# Patient Record
Sex: FEMALE | Race: BLACK OR AFRICAN AMERICAN
[De-identification: names, ages, dates, MRNs, and addresses within clinical notes are randomized per-mention and may not be internally consistent; named-entity substitution may affect disease eponyms.]

---

## 2017-06-02 ENCOUNTER — HOSPITAL ENCOUNTER (EMERGENCY)
Dept: HOSPITAL 74 - JER | Age: 42
Discharge: HOME | End: 2017-06-02
Payer: COMMERCIAL

## 2017-06-02 VITALS — TEMPERATURE: 98 F | HEART RATE: 64 BPM | SYSTOLIC BLOOD PRESSURE: 109 MMHG | DIASTOLIC BLOOD PRESSURE: 71 MMHG

## 2017-06-02 VITALS — BODY MASS INDEX: 20.7 KG/M2

## 2017-06-02 DIAGNOSIS — N93.9: Primary | ICD-10-CM

## 2017-06-02 LAB
ALBUMIN SERPL-MCNC: 3.7 G/DL (ref 3.4–5)
ALP SERPL-CCNC: 50 U/L (ref 45–117)
ALT SERPL-CCNC: 24 U/L (ref 12–78)
ANION GAP SERPL CALC-SCNC: 9 MMOL/L (ref 8–16)
APPEARANCE UR: CLEAR
AST SERPL-CCNC: 18 U/L (ref 15–37)
BACTERIA #/AREA URNS HPF: (no result) /HPF
BASOPHILS # BLD: 1.3 % (ref 0–2)
BILIRUB SERPL-MCNC: 1.1 MG/DL (ref 0.2–1)
BILIRUB UR STRIP.AUTO-MCNC: NEGATIVE MG/DL
CALCIUM SERPL-MCNC: 8.4 MG/DL (ref 8.5–10.1)
CO2 SERPL-SCNC: 23 MMOL/L (ref 21–32)
COLOR UR: (no result)
CREAT SERPL-MCNC: 0.6 MG/DL (ref 0.55–1.02)
DEPRECATED RDW RBC AUTO: 13.5 % (ref 11.6–15.6)
EOSINOPHIL # BLD: 4.9 % (ref 0–4.5)
GLUCOSE SERPL-MCNC: 83 MG/DL (ref 74–106)
KETONES UR QL STRIP: NEGATIVE
LEUKOCYTE ESTERASE UR QL STRIP.AUTO: NEGATIVE
MCH RBC QN AUTO: 30.6 PG (ref 25.7–33.7)
MCHC RBC AUTO-ENTMCNC: 33.9 G/DL (ref 32–36)
MCV RBC: 90.2 FL (ref 80–96)
MUCOUS THREADS URNS QL MICRO: (no result)
NEUTROPHILS # BLD: 47.7 % (ref 42.8–82.8)
NITRITE UR QL STRIP: NEGATIVE
PH UR: 6 [PH] (ref 5–8)
PLATELET # BLD AUTO: 160 K/MM3 (ref 134–434)
PMV BLD: 8.4 FL (ref 7.5–11.1)
PROT SERPL-MCNC: 6.7 G/DL (ref 6.4–8.2)
PROT UR QL STRIP: NEGATIVE
PROT UR QL STRIP: NEGATIVE
RBC # BLD AUTO: 2 /HPF (ref 0–3)
RBC # UR STRIP: (no result) /UL
SP GR UR: 1.02 (ref 1–1.02)
UROBILINOGEN UR STRIP-MCNC: NEGATIVE E.U./DL (ref 0.2–1)
WBC # BLD AUTO: 4.3 K/MM3 (ref 4–10)
WBC # UR AUTO: 2 /HPF (ref 3–5)

## 2017-08-20 ENCOUNTER — HOSPITAL ENCOUNTER (EMERGENCY)
Dept: HOSPITAL 74 - JER | Age: 42
Discharge: HOME | End: 2017-08-20
Payer: COMMERCIAL

## 2017-08-20 VITALS — HEART RATE: 68 BPM | SYSTOLIC BLOOD PRESSURE: 122 MMHG | DIASTOLIC BLOOD PRESSURE: 65 MMHG

## 2017-08-20 VITALS — TEMPERATURE: 98 F

## 2017-08-20 VITALS — BODY MASS INDEX: 20.7 KG/M2

## 2017-08-20 DIAGNOSIS — Z87.09: ICD-10-CM

## 2017-08-20 DIAGNOSIS — R00.2: Primary | ICD-10-CM

## 2017-08-20 LAB
ALBUMIN SERPL-MCNC: 4.3 G/DL (ref 3.4–5)
ALP SERPL-CCNC: 51 U/L (ref 45–117)
ALT SERPL-CCNC: 22 U/L (ref 12–78)
ANION GAP SERPL CALC-SCNC: 7 MMOL/L (ref 8–16)
APPEARANCE UR: (no result)
AST SERPL-CCNC: 17 U/L (ref 15–37)
BASOPHILS # BLD: 0.9 % (ref 0–2)
BILIRUB SERPL-MCNC: 1.3 MG/DL (ref 0.2–1)
BILIRUB UR STRIP.AUTO-MCNC: NEGATIVE MG/DL
CALCIUM SERPL-MCNC: 9.1 MG/DL (ref 8.5–10.1)
CK SERPL-CCNC: 517 IU/L (ref 26–192)
CO2 SERPL-SCNC: 27 MMOL/L (ref 21–32)
COLOR UR: YELLOW
CREAT SERPL-MCNC: 0.8 MG/DL (ref 0.55–1.02)
DEPRECATED RDW RBC AUTO: 13.7 % (ref 11.6–15.6)
EOSINOPHIL # BLD: 5.8 % (ref 0–4.5)
GLUCOSE SERPL-MCNC: 84 MG/DL (ref 74–106)
KETONES UR QL STRIP: NEGATIVE
LEUKOCYTE ESTERASE UR QL STRIP.AUTO: NEGATIVE
MCH RBC QN AUTO: 30.8 PG (ref 25.7–33.7)
MCHC RBC AUTO-ENTMCNC: 34.1 G/DL (ref 32–36)
MCV RBC: 90.3 FL (ref 80–96)
MUCOUS THREADS URNS QL MICRO: (no result)
NEUTROPHILS # BLD: 45.4 % (ref 42.8–82.8)
NITRITE UR QL STRIP: NEGATIVE
PH UR: 5 [PH] (ref 5–8)
PLATELET # BLD AUTO: 201 K/MM3 (ref 134–434)
PMV BLD: 9.1 FL (ref 7.5–11.1)
PROT SERPL-MCNC: 7.7 G/DL (ref 6.4–8.2)
PROT UR QL STRIP: NEGATIVE
PROT UR QL STRIP: NEGATIVE
RBC # BLD AUTO: 202 /HPF (ref 0–3)
RBC # UR STRIP: (no result) /UL
SP GR UR: 1.02 (ref 1–1.02)
TROPONIN I SERPL-MCNC: < 0.02 NG/ML (ref 0–0.05)
TSH SERPL-ACNC: 1.03 UIU/ML (ref 0.36–3.74)
UROBILINOGEN UR STRIP-MCNC: NEGATIVE MG/DL (ref 0.2–1)
WBC # BLD AUTO: 4.8 K/MM3 (ref 4–10)
WBC # UR AUTO: 15 /HPF (ref 3–5)

## 2017-08-20 NOTE — EKG
Test Reason : 

Blood Pressure : ***/*** mmHG

Vent. Rate : 062 BPM     Atrial Rate : 062 BPM

   P-R Int : 154 ms          QRS Dur : 074 ms

    QT Int : 406 ms       P-R-T Axes : 069 048 041 degrees

   QTc Int : 412 ms

 

NORMAL SINUS RHYTHM

NORMAL ECG

NO PREVIOUS ECGS AVAILABLE

Confirmed by PEMA FAUSTIN MD ([id number]) on 8/20/2017 1:36:39 PM

 

Referred By:             Confirmed By:PEMA FAUSTIN MD

## 2018-11-13 ENCOUNTER — HOSPITAL ENCOUNTER (EMERGENCY)
Dept: HOSPITAL 74 - JERFT | Age: 43
Discharge: HOME | End: 2018-11-13
Payer: COMMERCIAL

## 2018-11-13 VITALS — DIASTOLIC BLOOD PRESSURE: 57 MMHG | SYSTOLIC BLOOD PRESSURE: 105 MMHG | TEMPERATURE: 98.2 F | HEART RATE: 69 BPM

## 2018-11-13 VITALS — BODY MASS INDEX: 21 KG/M2
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
## 2018-11-13 DIAGNOSIS — Z87.09: ICD-10-CM

## 2018-11-13 DIAGNOSIS — Z86.2: ICD-10-CM
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
## 2018-11-13 DIAGNOSIS — S16.1XXA: Primary | ICD-10-CM

## 2018-11-13 NOTE — PDOC
History of Present Illness





- General


Chief Complaint: Pain, Acute


Stated Complaint: HEADACHE


Time Seen by Provider: 11/13/18 14:59





- History of Present Illness


Initial Comments: 





11/13/18 15:06


43-year-old female without comorbidities presents for evaluation of atraumatic 

onset of neck pain 2 weeks. She describes a feeling of crepitation with 

flexion and extension movements not rotation no radicular symptoms no systemic 

symptoms





Past History





- Past Medical History


Allergies/Adverse Reactions: 


 Allergies











Allergy/AdvReac Type Severity Reaction Status Date / Time


 


No Known Allergies Allergy   Verified 08/20/17 08:34











Home Medications: 


Ambulatory Orders





NK [No Known Home Medication]  06/02/17 








Asthma: Yes


COPD: No


GI Disorders: Yes (fibroids)





- Suicide/Smoking/Psychosocial Hx


Smoking History: Never smoked


Have you smoked in the past 12 months: No


Hx Alcohol Use: No


Drug/Substance Use Hx: No


Substance Use Type: None





**Review of Systems





- Review of Systems


Musculoskeletal: Yes: Neck Pain





*Physical Exam





- Vital Signs


 Last Vital Signs











Temp Pulse Resp BP Pulse Ox


 


 98.2 F   69   16   105/57 L  99 


 


 11/13/18 13:17  11/13/18 13:17  11/13/18 13:17  11/13/18 13:17  11/13/18 13:17














- Physical Exam


Comments: 





11/13/18 15:06


HEAD: NC/AT


EYES: Conjuntiva clear EOMI PERRL


Ears: Canals and TM's normal


NOSE: No d/c


THROAT: Moist mucous membrances, oral pharanx clear, uvula midline


NECK: Supple without adenopathy


CARDIAC: S1 S2


LUNGS: CTA Full and Equal breath sounds


ABDOMEN: Soft NT ND


MS: Full ROM in all joints without edema 


NEUROLOGIC: No gross sensory or motor deficits, NVID


SKIN: Normal color and temperature no lesions or rashes





Cervical spine skin color and temperature are normal range of motion is full 

without crepitation. Is mild left-sided paracervical musculature spasm. No 

midline tenderness. 5 out of 5 strength in bilateral upper extremities without 

gross sensorimotor deficits and a negative Spurling maneuver.





Medical Decision Making





- Medical Decision Making





11/13/18 15:07


Cervical strain, I've offered her Flexeril which she has refused. I will give 

her follow-up with spine surgery





*DC/Admit/Observation/Transfer


Diagnosis at time of Disposition: 


 Cervical strain








- Discharge Dispostion


Disposition: HOME


Condition at time of disposition: Stable


Decision to Admit order: No





- Referrals


Referrals: 


Bharath Bejarano MD [Staff Physician] - 





- Patient Instructions


Printed Discharge Instructions:  DI for Cervical Muscle Strain


Additional Instructions: 


Return to the emergency room should symptoms worsen or go unresolved. Please 

follow-up with orthopedic spine surgery for further evaluation and treatment 

options.





- Post Discharge Activity

## 2021-10-04 NOTE — PDOC
Attending Attestation





- Resident


Resident Name: Darci Mackey





- ED Attending Attestation


I have performed the following: I have examined & evaluated the patient, The 

case was reviewed & discussed with the resident, I agree w/resident's findings 

& plan, Exceptions are as noted





- HPI


HPI: 





08/20/17 11:54


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 

pain. feels under stress recently. no caffeine, no new medications. drinking 

vinegar. not eating and drinking as much. no f/c no leg swelling. no h/o pe or 

dvt. no other complaints. no family h/o premature cAD, only brother with h/o 

rheumatic heart disease. ( valve replacement)





- Physicial Exam


PE: 





08/20/17 11:56


awake alert lungs clear heart rrr no mrg.a bd soft nt nd. leg no edema no calf 

tenderness. pulses symmetric. nuero alert oriented x 3





- Medical Decision Making





08/20/17 11:57


plan r/o electrolyte abnormality, thyroid abnormality, r/o infection, pregnancy 

anemia. if normal consider dc outpt cardiology followup. low risk for heart 

disease. minimal risk factors.  





**Heart Score/ECG Review


  ** #1


General ECG Interpretation: Sinus Rhythm, Normal Rate (62), Normal Intervals, 

No acute ischemic changes
History of Present Illness





- General


Chief Complaint: Pain


Stated Complaint: PALPITATIONS


Time Seen by Provider: 08/20/17 09:08


History Source: Patient





- History of Present Illness


Initial Comments: 


08/20/17 11:05


42F with no pmh presents with palpitation on/off since Thursday and non-

radiating epigastric pain for the past 2 days.


The epigastric pain does't seem to be related to prandial state. Patient admits 

to be under certain amount of stress at work, works as a nurse at [employer].





08/20/17 11:54








Past History





- Past Medical History


Allergies/Adverse Reactions: 


 Allergies











Allergy/AdvReac Type Severity Reaction Status Date / Time


 


No Known Allergies Allergy   Verified 08/20/17 08:34











Home Medications: 


Ambulatory Orders





NK [No Known Home Medication]  06/02/17 








Asthma: Yes


GI Disorders: Yes (fibroids)





- Psycho/Social/Smoking Cessation Hx


Anxiety: No


Suicidal Ideation: No


Smoking History: Never smoked


Have you smoked in the past 12 months: No


Information on smoking cessation initiated: No


Hx Alcohol Use: No


Drug/Substance Use Hx: No


Substance Use Type: None





**Review of Systems





- Review of Systems


Constitutional: No: Symptoms Reported


HEENTM: No: Symptoms Reported


Respiratory: No: Symptoms reported


Cardiac (ROS): Yes: See HPI


ABD/GI: Yes: See HPI


: No: Symptoms Reported


Musculoskeletal: No: Symptoms Reported


Integumentary: No: Symptoms Reported


All Other Systems: Reviewed and Negative





*Physical Exam





- Vital Signs


 Last Vital Signs











Temp Pulse Resp BP Pulse Ox


 


 98.0 F   74   18   123/72   100 


 


 08/20/17 08:35  08/20/17 08:35  08/20/17 08:35  08/20/17 08:35  08/20/17 08:35














- Physical Exam


General Appearance: Yes: Nourished, Appropriately Dressed, Thin.  No: Apparent 

Distress


HEENT: positive: EOMI, MICHELL


Neck: positive: Trachea midline.  negative: Tender


Respiratory/Chest: positive: Lungs Clear, Normal Breath Sounds.  negative: 

Chest Tender, Respiratory Distress


Vascular Pulses: Dorsalis-Pedis (R): 2+, Doralis-Pedis (L): 2+


Gastrointestinal/Abdominal: positive: Normal Bowel Sounds, Flat, Soft.  negative

: Tender, Organomegaly


Extremity: positive: Normal Capillary Refill


Neurologic: positive: CNs II-XII NML intact, Fully Oriented, Alert, Normal Mood/

Affect





ED Treatment Course





- LABORATORY


CBC & Chemistry Diagram: 


 08/20/17 09:20





 08/20/17 09:20





Medical Decision Making





- Medical Decision Making


08/20/17 11:54


42F with no pmh presents with palpitation on/off since Thursday and non-

radiating epigastric pain for the past 2 days.


EKG negative, labs, urine negative. Trops negative.


 Patient to be discharged. 





08/20/17 12:16








08/20/17 12:17








08/20/17 12:59


Follow up with Dr. Donis if palpitations continue.





*DC/Admit/Observation/Transfer


Diagnosis at time of Disposition: 


 Intermittent palpitations





- Discharge Dispostion


Disposition: HOME


Admit: No





- Referrals


Referrals: 


Rolly Donis MD [Staff Physician] - 





- Patient Instructions


Printed Discharge Instructions:  DI for Palpitations


Additional Instructions: 


Follow up with Dr. Donis if palpitations continue.
Detail Level: Detailed
Quality 265: Biopsy Follow-Up: Biopsy results reviewed, communicated, tracked, and documented